# Patient Record
Sex: MALE | Race: AMERICAN INDIAN OR ALASKA NATIVE
[De-identification: names, ages, dates, MRNs, and addresses within clinical notes are randomized per-mention and may not be internally consistent; named-entity substitution may affect disease eponyms.]

---

## 2018-10-24 ENCOUNTER — HOSPITAL ENCOUNTER (EMERGENCY)
Dept: HOSPITAL 5 - ED | Age: 38
Discharge: HOME | End: 2018-10-24
Payer: MEDICAID

## 2018-10-24 VITALS — SYSTOLIC BLOOD PRESSURE: 133 MMHG | DIASTOLIC BLOOD PRESSURE: 86 MMHG
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
## 2018-10-24 DIAGNOSIS — S52.91XA: Primary | ICD-10-CM

## 2018-10-24 DIAGNOSIS — F17.200: ICD-10-CM
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
## 2018-10-24 NOTE — XRAY REPORT
FINAL REPORT



EXAM:  XR ELBOW 3+V RT



HISTORY:  fall with elbow pain 



TECHNIQUE:  Three views of the right elbow were submitted.



FINDINGS:  

The elbow joint is well maintained. There is no evidence acute

fracture or joint effusion. The adjacent soft tissues appear

normal.



IMPRESSION:  

No evidence of acute injury involving the right elbow.

## 2018-10-24 NOTE — XRAY REPORT
FINAL REPORT



EXAM:  XR FOREARM RT



HISTORY:  fall with arm pain 



TECHNIQUE:  Three views of the right forearm were submitted.



FINDINGS:  

There is an acute impacted intra-articular fracture of the distal

radial metaphysis with dorsal angulation of the distal fracture

end. There is overlying dorsal soft tissue swelling. There are no

additional fractures in the forearm. The elbow joint is not show

any acute changes.



IMPRESSION:  

Acute impacted intra-articular fracture of the distal metaphysis

with dorsal angulation of the distal fracture end.

## 2018-10-24 NOTE — EMERGENCY DEPARTMENT REPORT
<ROBERTA CHRISTIANSON - Last Filed: 10/24/18 08:51>





ED Upper Extremity Inj HPI





- General


Chief Complaint: Extremity Injury, Upper


Stated Complaint: ARM BROKEN/PAIN


Time Seen by Provider: 10/24/18 06:13


Source: patient


Mode of arrival: Ambulatory


Limitations: No Limitations





- History of Present Illness


Initial Comments: 





This is a 38-year-old male nontoxic, well nourished in appearance, no acute 

signs of distress presents to the ED with c/o of right arm pain 1 week.  

Patient stated that he had a fall from the ladder last week and was treated by 

family member at home with a forearm splint.  Patient came in with a fiber 

glass in appearance splint to the forearm.  Patient denies any other trauma.  

Patient stated is about 12 foot ladder and while falling patient wanted to 

break fall by falling on the wrist.  Patient denies any other trauma. Denies 

any head trauma, neck trauma, or back trauma.  Patient denies any numbness, 

tingling, fever, chills, nausea, vomiting, chest pain, shortness of breath, 

headache, stiff neck.  Patient denies any joint swelling or joint redness.  

Patient stated has decreased ROM due to pain.  Patient denies any allergies or 

significant past medical history.


MD Complaint: Injury to:: right, forearm, wrist


-: week(s) (1)


Other Extremity Injury: Wrist: Right, Forearm: Right


Other Injuries: none


Place: home


Severity scale (0 -10): 8


Improves With: immobilization


Worsens With: movement of extremity


Context: fall


Associated Symptoms: denies other symptoms.  denies: weakness, numbness, neck 

pain, suspects foreign body, nausea/vomiting, heard/felt popping sensat





- Related Data


 Previous Rx's











 Medication  Instructions  Recorded  Last Taken  Type


 


Permethrin 5% [Acticin 5% CREAM] 1 applicatio TP ONCE #1 tube 03/11/14 Unknown 

Rx


 


Ibuprofen [Motrin] 600 mg PO Q8H PRN #20 tablet 10/24/18 Unknown Rx


 


traMADol [Ultram] 50 mg PO Q6HR PRN #12 tablet 10/24/18 Unknown Rx











 Allergies











Allergy/AdvReac Type Severity Reaction Status Date / Time


 


No Known Allergies Allergy   Unverified 03/11/14 17:55














ED Review of Systems


ROS: 


Stated complaint: ARM BROKEN/PAIN


Other details as noted in HPI





Constitutional: denies: chills, fever


Eyes: denies: eye pain, eye discharge, vision change


ENT: denies: ear pain, throat pain


Respiratory: denies: cough, shortness of breath, wheezing


Cardiovascular: denies: chest pain, palpitations


Endocrine: no symptoms reported


Gastrointestinal: denies: abdominal pain, nausea, diarrhea


Genitourinary: denies: urgency, dysuria


Musculoskeletal: denies: back pain, joint swelling, arthralgia


Skin: denies: rash, lesions


Neurological: denies: headache, weakness, paresthesias


Psychiatric: denies: anxiety, depression


Hematological/Lymphatic: denies: easy bleeding, easy bruising





ED Past Medical Hx





- Past Medical History


Previous Medical History?: No





- Surgical History


Past Surgical History?: No





- Social History


Smoking Status: Current Every Day Smoker


Substance Use Type: Alcohol





- Medications


Home Medications: 


 Home Medications











 Medication  Instructions  Recorded  Confirmed  Last Taken  Type


 


Permethrin 5% [Acticin 5% CREAM] 1 applicatio TP ONCE #1 tube 03/11/14  Unknown 

Rx


 


Ibuprofen [Motrin] 600 mg PO Q8H PRN #20 tablet 10/24/18  Unknown Rx


 


traMADol [Ultram] 50 mg PO Q6HR PRN #12 tablet 10/24/18  Unknown Rx














ED Physical Exam





- General


Limitations: No Limitations


General appearance: alert, in no apparent distress





- Head


Head exam: Present: atraumatic, normocephalic





- Eye


Eye exam: Present: normal appearance





- ENT


ENT exam: Present: mucous membranes moist





- Neck


Neck exam: Present: normal inspection, full ROM.  Absent: tenderness, 

meningismus





- Respiratory


Respiratory exam: Present: normal lung sounds bilaterally.  Absent: respiratory 

distress, wheezes, rales, rhonchi, stridor, chest wall tenderness, accessory 

muscle use, decreased breath sounds, prolonged expiratory





- Cardiovascular


Cardiovascular Exam: Present: regular rate, normal rhythm, normal heart sounds.

  Absent: bradycardia, tachycardia, irregular rhythm, systolic murmur, 

diastolic murmur, rubs, gallop





- GI/Abdominal


GI/Abdominal exam: Present: soft, normal bowel sounds.  Absent: distended, 

tenderness, guarding, rebound, rigid, diminished bowel sounds





- Rectal


Rectal exam: Present: deferred





- Extremities Exam


Extremities exam: Present: normal inspection, full ROM, tenderness, normal 

capillary refill.  Absent: joint swelling





- Expanded Upper Extremity Exam


  ** Right


General: Present: normal inspection


Shoulder Exam: Present: normal inspection, full ROM.  Absent: tenderness, 

swelling


Upper Arm exam: Present: normal inspection, full ROM.  Absent: tenderness, 

swelling


Elbow exam: Present: normal inspection, full ROM.  Absent: tenderness, swelling

, abrasion, laceration, ecchymosis, deformity, crepidus, dislocation, erythema, 

effusion, pain w/ pronation/supination, tenderness over radial head


Forearm Wrist exam: Present: normal inspection, full ROM, tenderness, swelling.

  Absent: abrasion, laceration, ecchymosis, deformity, crepidus, dislocation, 

erythema, tenderness over anatomical snuff box, pain with axial thumb loading


Hand Wrist exam: Present: normal inspection, full ROM, tenderness, swelling.  

Absent: abrasion, laceration, ecchymosis, deformity, crepidus, dislocation, 

erythema, amputation, nail avulsion, subungual hematoma


Neuro motor exam: Present: wrist extension intact, thumb opposition intact, 

thumb IP flexion intact, thumb adduction intact, fingers 2-5 abduction intact


Neurosensory exam: Present: 2-point discrimination, radial nerve intact, ulnar 

nerve intact, median nerve intact


Vascular: Present: vascular compromise, normal capillary refill, radial pulse, 

brachial pulse, ulnar pulse





- Back Exam


Back exam: Present: normal inspection, full ROM.  Absent: tenderness, CVA 

tenderness (R), CVA tenderness (L), muscle spasm, paraspinal tenderness, 

vertebral tenderness, rash noted





- Neurological Exam


Neurological exam: Present: alert, oriented X3, normal gait





- Psychiatric


Psychiatric exam: Present: normal affect, normal mood





- Skin


Skin exam: Present: warm, dry, intact, normal color.  Absent: rash





ED Course





 Vital Signs











  10/24/18





  06:09


 


Temperature 98.4 F


 


Pulse Rate 65


 


Respiratory 18





Rate 


 


Blood Pressure 133/86


 


O2 Sat by Pulse 97





Oximetry 














- Reevaluation(s)


Reevaluation #1: 





10/24/18 06:37


Patient is speaking in full sentences with no signs of distress noted.





ED Medical Decision Making





- Medical Decision Making





This is a 38-year-old male that presents with right radial fracture.  Patient 

is stable and was examined by me. X-ray has been obtained and dictated by the 

radiologist.  Patient is notified of the x-ray report with noted by the 

patient.  Patient does have normal gait with no tenderness and no joint 

swelling.  No ecchymosis. no joint redness or swelling. Not warm to touch. No 

signs of cellulites present. Patient received a sugar-tong OCL splint.  Patient 

also received a shoulder sling.  Post splint assessment: neurovasular intact; 

normal cap refill <2 second; normal sensation; denies decreaed sensation; 

normal ROM of digits..  Patient was instructed to RICE therapy.  Patient 

received Motrin for pain.  Patient is discharged with Tylenol with Codeine. At 

time of discharge, the patient does not seem toxic or ill in appearance.  No 

acute signs of distress noted.  Patient agrees to discharge treatment plan of 

care.  No further questions noted by the patient.


Critical care attestation.: 


If time is entered above; I have spent that time in minutes in the direct care 

of this critically ill patient, excluding procedure time.








ED Disposition


Disposition: DC-01 TO HOME OR SELFCARE


Is pt being admited?: No


Does the pt Need Aspirin: No


Condition: Stable


Instructions:  Acetaminophen/Codeine (By mouth), Wrist Fracture in Adults (ED), 

Splint Care (ED), RICE Therapy (ED)


Additional Instructions: 


Follow-up with a orthopedic doctor in 2-3 days or if symptoms worsen and 

continue return to emergency room as soon as possible. 





Prescriptions: 


Ibuprofen [Motrin] 600 mg PO Q8H PRN #20 tablet


 PRN Reason: Pain


traMADol [Ultram] 50 mg PO Q6HR PRN #12 tablet


 PRN Reason: Pain


Referrals: 


PRIMARY CARE,MD [Primary Care Provider] - 3-5 Days


[facility] [Outside] - 3-5 Days


DESIREE DELGADILLO MD [Staff Physician] - 2-3 Days


Forms:  Work/School Release Form(ED)





<MAXIMO TAI - Last Filed: 10/24/18 09:09>





ED Medical Decision Making





- Medical Decision Making





I did have a face-to-face with this patient.  Splint was placed by nursing 

staff and I did review the splint was placed in the right upper extremity 

before the patient left the hospital.  The patient had good movement of his 

fingers and was neurovascularly intact.

## 2018-10-24 NOTE — XRAY REPORT
FINAL REPORT



EXAM:  XR HAND 3+V RT



HISTORY:  fall with hand pain/swelling 



TECHNIQUE:  Three views of the right hand were submitted.



FINDINGS:  

There is an acute impacted intra-articular fracture of the distal

metaphysis of the radius with slight dorsal angulation of the

distal fracture end. The adjacent ulnar styloid process appears

intact. The carpal bones show no evidence of acute injury. There

is a chronic fracture deformity involving the distal end of the

5th metacarpal. The soft tissues reveal soft tissue swelling

overlying the dorsal aspect of the wrist.



IMPRESSION:  

Acute impacted intra-articular fracture of the distal radial

metaphysis with dorsal soft tissue swelling.



Chronic fracture deformity of the distal end the 5th metacarpal.

## 2022-04-20 ENCOUNTER — OFFICE VISIT (OUTPATIENT)
Dept: URBAN - METROPOLITAN AREA CLINIC 92 | Facility: CLINIC | Age: 42
End: 2022-04-20

## 2022-04-27 ENCOUNTER — OFFICE VISIT (OUTPATIENT)
Dept: URBAN - METROPOLITAN AREA CLINIC 92 | Facility: CLINIC | Age: 42
End: 2022-04-27

## 2022-05-26 ENCOUNTER — DASHBOARD ENCOUNTERS (OUTPATIENT)
Age: 42
End: 2022-05-26

## 2022-05-26 ENCOUNTER — OFFICE VISIT (OUTPATIENT)
Dept: URBAN - METROPOLITAN AREA CLINIC 25 | Facility: CLINIC | Age: 42
End: 2022-05-26
Payer: COMMERCIAL

## 2022-05-26 VITALS
HEIGHT: 71 IN | WEIGHT: 218 LBS | HEART RATE: 62 BPM | SYSTOLIC BLOOD PRESSURE: 100 MMHG | TEMPERATURE: 97 F | BODY MASS INDEX: 30.52 KG/M2 | DIASTOLIC BLOOD PRESSURE: 60 MMHG

## 2022-05-26 DIAGNOSIS — R11.2 NAUSEA WITH VOMITING, UNSPECIFIED: ICD-10-CM

## 2022-05-26 DIAGNOSIS — R11.14 BILIOUS VOMITING WITH NAUSEA: ICD-10-CM

## 2022-05-26 DIAGNOSIS — F12.90 CANNABIS USE, UNSPECIFIED, UNCOMPLICATED: ICD-10-CM

## 2022-05-26 DIAGNOSIS — R10.84 GENERALIZED ABDOMINAL CRAMPING: ICD-10-CM

## 2022-05-26 PROBLEM — 428823006: Status: ACTIVE | Noted: 2022-05-26

## 2022-05-26 PROBLEM — 218791008: Status: ACTIVE | Noted: 2022-05-26

## 2022-05-26 PROCEDURE — 99204 OFFICE O/P NEW MOD 45 MIN: CPT | Performed by: INTERNAL MEDICINE

## 2022-05-26 RX ORDER — CYPROHEPTADINE HYDROCHLORIDE 4 MG/1
1 TABLET TABLET ORAL TWICE A DAY
Qty: 60 TABLETS | Refills: 4 | OUTPATIENT

## 2022-05-26 NOTE — HPI-TODAY'S VISIT:
42 yo pt presents with chronic generalized abdominal pain for 1 year and sxs are about the same but occasional flares. Pt has been to the er for 4 episodes.

## 2022-05-27 PROBLEM — 71419002: Status: ACTIVE | Noted: 2022-05-26

## 2022-05-27 PROBLEM — 102614006: Status: ACTIVE | Noted: 2022-05-26

## 2022-06-03 ENCOUNTER — CLAIMS CREATED FROM THE CLAIM WINDOW (OUTPATIENT)
Dept: URBAN - METROPOLITAN AREA CLINIC 4 | Facility: CLINIC | Age: 42
End: 2022-06-03
Payer: COMMERCIAL

## 2022-06-03 ENCOUNTER — WEB ENCOUNTER (OUTPATIENT)
Dept: URBAN - METROPOLITAN AREA SURGERY CENTER 20 | Facility: SURGERY CENTER | Age: 42
End: 2022-06-03

## 2022-06-03 ENCOUNTER — OFFICE VISIT (OUTPATIENT)
Dept: URBAN - METROPOLITAN AREA SURGERY CENTER 20 | Facility: SURGERY CENTER | Age: 42
End: 2022-06-03
Payer: COMMERCIAL

## 2022-06-03 DIAGNOSIS — B96.81 HELICOBACTER PYLORI [H. PYLORI] AS THE CAUSE OF DISEASES CLASSIFIED ELSEWHERE: ICD-10-CM

## 2022-06-03 DIAGNOSIS — B96.81 BACTERIAL INFECTION DUE TO H. PYLORI: ICD-10-CM

## 2022-06-03 DIAGNOSIS — K29.60 ADENOPAPILLOMATOSIS GASTRICA: ICD-10-CM

## 2022-06-03 DIAGNOSIS — K29.60 OTHER GASTRITIS WITHOUT BLEEDING: ICD-10-CM

## 2022-06-03 DIAGNOSIS — K26.7 CHRONIC DUODENAL ULCER: ICD-10-CM

## 2022-06-03 PROCEDURE — G8907 PT DOC NO EVENTS ON DISCHARG: HCPCS | Performed by: INTERNAL MEDICINE

## 2022-06-03 PROCEDURE — 43239 EGD BIOPSY SINGLE/MULTIPLE: CPT | Performed by: INTERNAL MEDICINE

## 2022-06-03 PROCEDURE — 88305 TISSUE EXAM BY PATHOLOGIST: CPT | Performed by: PATHOLOGY

## 2022-06-03 PROCEDURE — 88342 IMHCHEM/IMCYTCHM 1ST ANTB: CPT | Performed by: PATHOLOGY

## 2022-06-03 RX ORDER — CYPROHEPTADINE HYDROCHLORIDE 4 MG/1
1 TABLET TABLET ORAL TWICE A DAY
Qty: 60 TABLETS | Refills: 4 | Status: ACTIVE | COMMUNITY

## 2022-06-17 ENCOUNTER — OFFICE VISIT (OUTPATIENT)
Dept: URBAN - METROPOLITAN AREA SURGERY CENTER 20 | Facility: SURGERY CENTER | Age: 42
End: 2022-06-17

## 2022-06-20 ENCOUNTER — TELEPHONE ENCOUNTER (OUTPATIENT)
Dept: URBAN - METROPOLITAN AREA CLINIC 25 | Facility: CLINIC | Age: 42
End: 2022-06-20

## 2022-06-20 RX ORDER — LANSOPRAZOLE 30 MG/1
1 CAPSULE CAPSULE, DELAYED RELEASE ORAL TWICE A DAY
Qty: 28 CAPSULE | Refills: 0 | OUTPATIENT
Start: 2022-06-20

## 2022-06-20 RX ORDER — CYPROHEPTADINE HYDROCHLORIDE 4 MG/1
1 TABLET TABLET ORAL TWICE A DAY
Qty: 60 TABLETS | Refills: 4 | Status: ACTIVE | COMMUNITY

## 2022-06-20 RX ORDER — CLARITHROMYCIN 500 MG/1
1 TABLET TABLET, FILM COATED ORAL
Qty: 28 TABLET | Refills: 0 | OUTPATIENT
Start: 2022-06-20 | End: 2022-07-04

## 2022-06-20 RX ORDER — AMOXICILLIN 500 MG/1
2 CAPSULES CAPSULE ORAL
Qty: 56 CAPSULE | Refills: 0 | OUTPATIENT
Start: 2022-06-20 | End: 2022-07-04

## 2022-06-23 ENCOUNTER — TELEPHONE ENCOUNTER (OUTPATIENT)
Dept: URBAN - METROPOLITAN AREA CLINIC 25 | Facility: CLINIC | Age: 42
End: 2022-06-23

## 2022-06-30 ENCOUNTER — OFFICE VISIT (OUTPATIENT)
Dept: URBAN - METROPOLITAN AREA CLINIC 25 | Facility: CLINIC | Age: 42
End: 2022-06-30